# Patient Record
Sex: MALE | Race: OTHER | HISPANIC OR LATINO | URBAN - METROPOLITAN AREA
[De-identification: names, ages, dates, MRNs, and addresses within clinical notes are randomized per-mention and may not be internally consistent; named-entity substitution may affect disease eponyms.]

---

## 2018-12-09 ENCOUNTER — EMERGENCY (EMERGENCY)
Facility: HOSPITAL | Age: 28
LOS: 1 days | Discharge: HOME | End: 2018-12-09
Admitting: EMERGENCY MEDICINE
Payer: SELF-PAY

## 2018-12-09 VITALS
HEART RATE: 70 BPM | WEIGHT: 134.92 LBS | RESPIRATION RATE: 17 BRPM | SYSTOLIC BLOOD PRESSURE: 145 MMHG | DIASTOLIC BLOOD PRESSURE: 95 MMHG | OXYGEN SATURATION: 99 % | TEMPERATURE: 99 F

## 2018-12-09 VITALS
RESPIRATION RATE: 18 BRPM | HEART RATE: 85 BPM | OXYGEN SATURATION: 100 % | TEMPERATURE: 98 F | DIASTOLIC BLOOD PRESSURE: 101 MMHG | SYSTOLIC BLOOD PRESSURE: 141 MMHG

## 2018-12-09 PROCEDURE — 99283 EMERGENCY DEPT VISIT LOW MDM: CPT | Mod: 25

## 2018-12-09 PROCEDURE — 90715 TDAP VACCINE 7 YRS/> IM: CPT

## 2018-12-09 PROCEDURE — 12001 RPR S/N/AX/GEN/TRNK 2.5CM/<: CPT

## 2018-12-09 PROCEDURE — 90471 IMMUNIZATION ADMIN: CPT

## 2018-12-09 PROCEDURE — 99282 EMERGENCY DEPT VISIT SF MDM: CPT | Mod: 25

## 2018-12-09 RX ORDER — TETANUS TOXOID, REDUCED DIPHTHERIA TOXOID AND ACELLULAR PERTUSSIS VACCINE, ADSORBED 5; 2.5; 8; 8; 2.5 [IU]/.5ML; [IU]/.5ML; UG/.5ML; UG/.5ML; UG/.5ML
0.5 SUSPENSION INTRAMUSCULAR ONCE
Qty: 0 | Refills: 0 | Status: COMPLETED | OUTPATIENT
Start: 2018-12-09 | End: 2018-12-09

## 2018-12-09 RX ORDER — LIDOCAINE HCL 20 MG/ML
10 VIAL (ML) INJECTION ONCE
Qty: 0 | Refills: 0 | Status: COMPLETED | OUTPATIENT
Start: 2018-12-09 | End: 2018-12-09

## 2018-12-09 RX ORDER — TETANUS TOXOID, REDUCED DIPHTHERIA TOXOID AND ACELLULAR PERTUSSIS VACCINE, ADSORBED 5; 2.5; 8; 8; 2.5 [IU]/.5ML; [IU]/.5ML; UG/.5ML; UG/.5ML; UG/.5ML
0.5 SUSPENSION INTRAMUSCULAR ONCE
Qty: 0 | Refills: 0 | Status: DISCONTINUED | OUTPATIENT
Start: 2018-12-09 | End: 2018-12-09

## 2018-12-09 RX ADMIN — Medication 10 MILLILITER(S): at 14:29

## 2018-12-09 RX ADMIN — TETANUS TOXOID, REDUCED DIPHTHERIA TOXOID AND ACELLULAR PERTUSSIS VACCINE, ADSORBED 0.5 MILLILITER(S): 5; 2.5; 8; 8; 2.5 SUSPENSION INTRAMUSCULAR at 14:42

## 2018-12-09 NOTE — ED PROVIDER NOTE - NS ED ROS FT
Constitutional: No fever. No chills.  Eyes: No redness. No discharge. No vision change.   ENT: No sore throat. No ear pain.  Cardiovascular: No chest pain. No leg swelling.  Respiratory: No cough. No shortness of breath.  GI: No abdominal pain. No vomiting. No diarrhea.   MSK: No joint pain. No back pain.   Skin: No rash. No abrasions. +laceration.  Neuro: No numbness. No weakness.   Psych: No known mental health issues.

## 2018-12-09 NOTE — ED ADULT NURSE NOTE - OBJECTIVE STATEMENT
Pt presented to ED with 2cm laceration to left 1st finger. Pt reports that pt has cut the finger with a knife at work. Bleeding controlled, no blood thinner. Pt C/O pain to the site. Unknown tetanus status. Pt denies chest pain, SOB, dizziness, fever nor any other symptom at this time, NAD noted.

## 2018-12-09 NOTE — ED PROVIDER NOTE - PHYSICAL EXAMINATION
VITAL SIGNS: I have reviewed nursing notes and confirm.  CONSTITUTIONAL: Well-developed; well-nourished; in no acute distress.   SKIN:  warm and dry, no acute rash. 0.5 cm linear laceration over left thumb pad.   HEAD:  normocephalic, atraumatic.  EYES: PERRL, EOM intact; conjunctiva and sclera clear.  ENT: No nasal discharge; airway clear.   NECK: Supple; non tender.  EXT: Normal ROM. No clubbing, cyanosis or edema. 2+ pulses to b/l ue/le.  Left 1st digit: no bony tenderness. FROM at MCP and DIP. Flexor and extensor tendon strength intact. Distal sensation intact.   NEURO: Alert, oriented, grossly unremarkable  PSYCH: Cooperative, mood and affect appropriate.

## 2018-12-09 NOTE — ED PROVIDER NOTE - NSFOLLOWUPINSTRUCTIONS_ED_ALL_ED_FT
Mantener el vendaje marybeth 24 horas. Luego lávese las darlene cada día con agua tibia y aplique un nuevo vendaje. Regrese al Departamento de Emergencias en 7 días para quitar los puntos.

## 2018-12-09 NOTE — ED PROVIDER NOTE - OBJECTIVE STATEMENT
27yo RHD male with left thumb pad laceration. Pt states he was using a knife at work and cut his the pad of his left thumb. Washed wound on scene, bleeding controlled. Denies numbness or weakness. Unsure of last tdap.

## 2018-12-09 NOTE — ED PROVIDER NOTE - MEDICAL DECISION MAKING DETAILS
ED course unremarkable - afebrile and hemodynamically stable. 0.5cm laceration over left thumb pad without evidence of bony or tendon injury. Repaired in ED. Tdap updated. Will return in 7 days for suture removal. Return precautions given.
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
## 2018-12-13 DIAGNOSIS — S61.012A LACERATION WITHOUT FOREIGN BODY OF LEFT THUMB WITHOUT DAMAGE TO NAIL, INITIAL ENCOUNTER: ICD-10-CM
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
## 2018-12-13 DIAGNOSIS — Z23 ENCOUNTER FOR IMMUNIZATION: ICD-10-CM

## 2018-12-17 ENCOUNTER — EMERGENCY (EMERGENCY)
Facility: HOSPITAL | Age: 28
LOS: 1 days | Discharge: HOME | End: 2018-12-17
Admitting: EMERGENCY MEDICINE
Payer: SELF-PAY

## 2018-12-17 VITALS
RESPIRATION RATE: 18 BRPM | DIASTOLIC BLOOD PRESSURE: 79 MMHG | OXYGEN SATURATION: 98 % | HEART RATE: 61 BPM | SYSTOLIC BLOOD PRESSURE: 122 MMHG | TEMPERATURE: 98 F
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
## 2018-12-17 DIAGNOSIS — S61.012D LACERATION WITHOUT FOREIGN BODY OF LEFT THUMB WITHOUT DAMAGE TO NAIL, SUBSEQUENT ENCOUNTER: ICD-10-CM

## 2018-12-17 PROCEDURE — 99282 EMERGENCY DEPT VISIT SF MDM: CPT

## 2018-12-17 NOTE — ED PROVIDER NOTE - OBJECTIVE STATEMENT
suture removal, suture does not appear well healed 28 year old male s/p left thumb pad laceration repair on 12/9 presenting for suture removal.

## 2018-12-17 NOTE — ED PROVIDER NOTE - CHPI ED SYMPTOMS NEG
no bleeding/no bleeding at site/no chills/no redness/no drainage/no purulent drainage/no red streaks/no fever

## 2018-12-17 NOTE — ED ADULT NURSE NOTE - NSIMPLEMENTINTERV_GEN_ALL_ED
Implemented All Universal Safety Interventions:  [place] to call system. Call bell, personal items and telephone within reach. Instruct patient to call for assistance. Room bathroom lighting operational. Non-slip footwear when patient is off stretcher. Physically safe environment: no spills, clutter or unnecessary equipment. Stretcher in lowest position, wheels locked, appropriate side rails in place.

## 2018-12-17 NOTE — ED PROVIDER NOTE - NSFOLLOWUPINSTRUCTIONS_ED_ALL_ED_FT
Regrese a la tarsha de emergencias en 2 richard para la revisión y remoción de suturas de la herida.  [place] motrin para el dolor.

## 2018-12-17 NOTE — ED ADULT TRIAGE NOTE - CHIEF COMPLAINT QUOTE
pt returning for suture removal from left thumb , wound healing well pt returning for suture removal from left thumb , no exudate present

## 2018-12-19 ENCOUNTER — EMERGENCY (EMERGENCY)
Facility: HOSPITAL | Age: 28
LOS: 1 days | Discharge: HOME | End: 2018-12-19
Admitting: EMERGENCY MEDICINE
Payer: SELF-PAY

## 2018-12-19 VITALS
RESPIRATION RATE: 16 BRPM | OXYGEN SATURATION: 98 % | DIASTOLIC BLOOD PRESSURE: 82 MMHG | SYSTOLIC BLOOD PRESSURE: 122 MMHG | WEIGHT: 134.92 LBS | HEART RATE: 58 BPM | TEMPERATURE: 99 F

## 2018-12-19 DIAGNOSIS — S61.012D LACERATION WITHOUT FOREIGN BODY OF LEFT THUMB WITHOUT DAMAGE TO NAIL, SUBSEQUENT ENCOUNTER: ICD-10-CM
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
## 2018-12-19 PROCEDURE — 99212 OFFICE O/P EST SF 10 MIN: CPT

## 2018-12-19 NOTE — ED PROVIDER NOTE - OBJECTIVE STATEMENT
29 y/o m presents for suture removal from left thumb.  Pt stating sutures placed 11 days ago, wound healing well, no pain.

## 2018-12-19 NOTE — ED PROVIDER NOTE - MEDICAL DECISION MAKING DETAILS
PRE-OP EVALUATION    Patient Name: Everett Washington    Pre-op Diagnosis: History of colon polyps [Z86.010]    Procedure(s):  Colonoscopy    Surgeon(s) and Role:     Di Purcell MD - Primary    Pre-op vitals reviewed.   Temp: 98.8 °F (37.1 °C)  Puls Rfl:    predniSONE 5 MG Oral Tab Take 7.5 mg by mouth daily. Disp:  Rfl:    Cholecalciferol (VITAMIN D) 1000 UNITS Oral Tab Take 1,000 Int'l Units/day by mouth daily.  Disp:  Rfl:    docusate sodium 100 MG Oral Cap Take 100 mg by mouth 2 (two) times daily UNLISTED  1994    right knee repair of ligament cruciate anterior   • KNEE REPLACEMENT SURGERY     • KNEE SURGERY     • KNEE TOTAL REPLACEMENT Left 3/21/2016    Performed by Sharyon Meckel, MD at [facility] MAIN OR   • [address]  05/29/2012    fo 29 y/o m suture removal from left thumb; removed with no adverse event, d/c with wound care instructions.

## 2022-08-04 NOTE — ED PROVIDER NOTE - CARE PLAN
Patient is a 46y.o. year old male. Patient is in the office today with R knee pain. Pt states pain increases about midday while working, takes Tylenol in AM to help alleviate. Pain level will get up to a 10/10 by end of day and will have trouble walking. Injection was given in R knee on 8/23/21 by Isabelle Doan, stated helped for awhile. Saul Ceron Principal Discharge DX:	Laceration of left thumb without foreign body without damage to nail, initial encounter

## 2023-05-25 NOTE — ED PROVIDER NOTE - NS ED MD DISPO DISCHARGE
Home Quality 226: Preventive Care And Screening: Tobacco Use: Screening And Cessation Intervention: Patient screened for tobacco use and is an ex/non-smoker

## 2024-10-15 NOTE — ED ADULT NURSE NOTE - ISOLATION TYPE:
Abdomen , soft, nontender, nondistended , no guarding or rigidity , no masses palpable , normal bowel sounds , Liver and Spleen , no hepatomegaly present , no hepatosplenomegaly , liver nontender , spleen not palpable None